# Patient Record
(demographics unavailable — no encounter records)

---

## 2025-01-15 NOTE — PHYSICAL EXAM
[JVD] : no jugular venous distention  [Carotid Bruits] : no carotid bruits [Normal Breath Sounds] : Normal breath sounds [Normal Heart Sounds] : normal heart sounds [Abdominal Aorta] : Normal abdominal aorta [Right Carotid Bruit] : no bruit heard over the right carotid [Left Carotid Bruit] : no bruit heard over the left carotid [2+] : left 2+ [Abdominal Masses] : No abdominal masses [Abdomen Tenderness] : ~T ~M No abdominal tenderness [No HSM] : no hepatosplenomegaly [No Rash or Lesion] : No rash or lesion [Alert] : alert [Anxious] : anxious [de-identified] : Very pleasant, no acute distress [de-identified] : Abdomen is benign.  No hernias.

## 2025-01-15 NOTE — ASSESSMENT
[FreeTextEntry1] : Patient will require urine analysis and cystoscopy.  If enteral cutaneous fistula cannot be visualized then patient will require Gastrografin enema.  She was referred to urologist.

## 2025-01-15 NOTE — HISTORY OF PRESENT ILLNESS
[de-identified] : 68-year-old female who is being referred by Dr. Mejia to be evaluated for possible enterovesical fistula.  Patient underwent total abdominal hysterectomy for endometrial cancer in December 2022.  Subsequently she underwent radiation therapy.  In 2022 she was diagnosed with ulcerative colitis.  She has been undergoing annual colonoscopies.  Recently she had CT scan of the abdomen and pelvis done which showed bubbles of air in the urinary bladder.  Patient denies any UTI symptoms.  She denies any abdominal pain.